# Patient Record
Sex: FEMALE | Race: WHITE | NOT HISPANIC OR LATINO | Employment: STUDENT | URBAN - METROPOLITAN AREA
[De-identification: names, ages, dates, MRNs, and addresses within clinical notes are randomized per-mention and may not be internally consistent; named-entity substitution may affect disease eponyms.]

---

## 2022-11-29 ENCOUNTER — APPOINTMENT (EMERGENCY)
Dept: CT IMAGING | Facility: HOSPITAL | Age: 22
End: 2022-11-29

## 2022-11-29 ENCOUNTER — HOSPITAL ENCOUNTER (EMERGENCY)
Facility: HOSPITAL | Age: 22
Discharge: HOME | End: 2022-11-30
Attending: EMERGENCY MEDICINE

## 2022-11-29 DIAGNOSIS — R10.9 ABDOMINAL PAIN: ICD-10-CM

## 2022-11-29 DIAGNOSIS — N83.201 CYST OF RIGHT OVARY: Primary | ICD-10-CM

## 2022-11-29 LAB
ALBUMIN SERPL BCP-MCNC: 4.5 G/DL (ref 3.5–5)
ALP SERPL-CCNC: 49 U/L (ref 46–116)
ALT SERPL W P-5'-P-CCNC: 21 U/L (ref 12–78)
ANION GAP SERPL CALCULATED.3IONS-SCNC: 15 MMOL/L (ref 4–13)
AST SERPL W P-5'-P-CCNC: 18 U/L (ref 5–45)
BASOPHILS # BLD AUTO: 0.09 THOUSANDS/ÂΜL (ref 0–0.1)
BASOPHILS NFR BLD AUTO: 1 % (ref 0–1)
BILIRUB SERPL-MCNC: 1.12 MG/DL (ref 0.2–1)
BILIRUB UR QL STRIP: NEGATIVE
BUN SERPL-MCNC: 12 MG/DL (ref 5–25)
CALCIUM SERPL-MCNC: 9.6 MG/DL (ref 8.3–10.1)
CHLORIDE SERPL-SCNC: 97 MMOL/L (ref 96–108)
CLARITY UR: CLEAR
CO2 SERPL-SCNC: 23 MMOL/L (ref 21–32)
COLOR UR: ABNORMAL
CREAT SERPL-MCNC: 0.96 MG/DL (ref 0.6–1.3)
EOSINOPHIL # BLD AUTO: 0.17 THOUSAND/ÂΜL (ref 0–0.61)
EOSINOPHIL NFR BLD AUTO: 2 % (ref 0–6)
ERYTHROCYTE [DISTWIDTH] IN BLOOD BY AUTOMATED COUNT: 13.7 % (ref 11.6–15.1)
EXT PREGNANCY TEST URINE: NEGATIVE
EXT. CONTROL: NORMAL
GFR SERPL CREATININE-BSD FRML MDRD: 84 ML/MIN/1.73SQ M
GLUCOSE SERPL-MCNC: 86 MG/DL (ref 65–140)
GLUCOSE UR STRIP-MCNC: NEGATIVE MG/DL
HCT VFR BLD AUTO: 43.5 % (ref 34.8–46.1)
HGB BLD-MCNC: 14.5 G/DL (ref 11.5–15.4)
HGB UR QL STRIP.AUTO: NEGATIVE
IMM GRANULOCYTES # BLD AUTO: 0.03 THOUSAND/UL (ref 0–0.2)
IMM GRANULOCYTES NFR BLD AUTO: 0 % (ref 0–2)
KETONES UR STRIP-MCNC: ABNORMAL MG/DL
LEUKOCYTE ESTERASE UR QL STRIP: NEGATIVE
LIPASE SERPL-CCNC: 63 U/L (ref 73–393)
LYMPHOCYTES # BLD AUTO: 2.53 THOUSANDS/ÂΜL (ref 0.6–4.47)
LYMPHOCYTES NFR BLD AUTO: 24 % (ref 14–44)
MCH RBC QN AUTO: 28.7 PG (ref 26.8–34.3)
MCHC RBC AUTO-ENTMCNC: 33.3 G/DL (ref 31.4–37.4)
MCV RBC AUTO: 86 FL (ref 82–98)
MONOCYTES # BLD AUTO: 0.84 THOUSAND/ÂΜL (ref 0.17–1.22)
MONOCYTES NFR BLD AUTO: 8 % (ref 4–12)
NEUTROPHILS # BLD AUTO: 7.03 THOUSANDS/ÂΜL (ref 1.85–7.62)
NEUTS SEG NFR BLD AUTO: 65 % (ref 43–75)
NITRITE UR QL STRIP: NEGATIVE
NRBC BLD AUTO-RTO: 0 /100 WBCS
PH UR STRIP.AUTO: 5.5 [PH]
PLATELET # BLD AUTO: 263 THOUSANDS/UL (ref 149–390)
PMV BLD AUTO: 11.3 FL (ref 8.9–12.7)
POTASSIUM SERPL-SCNC: 3.9 MMOL/L (ref 3.5–5.3)
PROT SERPL-MCNC: 8.4 G/DL (ref 6.4–8.4)
PROT UR STRIP-MCNC: NEGATIVE MG/DL
RBC # BLD AUTO: 5.06 MILLION/UL (ref 3.81–5.12)
SODIUM SERPL-SCNC: 135 MMOL/L (ref 135–147)
SP GR UR STRIP.AUTO: 1.02 (ref 1–1.03)
UROBILINOGEN UR STRIP-ACNC: <2 MG/DL
WBC # BLD AUTO: 10.69 THOUSAND/UL (ref 4.31–10.16)

## 2022-11-29 RX ORDER — ONDANSETRON 2 MG/ML
4 INJECTION INTRAMUSCULAR; INTRAVENOUS ONCE
Status: COMPLETED | OUTPATIENT
Start: 2022-11-29 | End: 2022-11-29

## 2022-11-29 RX ADMIN — SODIUM CHLORIDE 1000 ML: 0.9 INJECTION, SOLUTION INTRAVENOUS at 21:51

## 2022-11-29 RX ADMIN — IOHEXOL 100 ML: 350 INJECTION, SOLUTION INTRAVENOUS at 22:59

## 2022-11-29 RX ADMIN — ONDANSETRON 4 MG: 2 INJECTION INTRAMUSCULAR; INTRAVENOUS at 21:55

## 2022-11-30 ENCOUNTER — ANESTHESIA (EMERGENCY)
Dept: PERIOP | Facility: HOSPITAL | Age: 22
End: 2022-11-30

## 2022-11-30 ENCOUNTER — APPOINTMENT (EMERGENCY)
Dept: ULTRASOUND IMAGING | Facility: HOSPITAL | Age: 22
End: 2022-11-30

## 2022-11-30 ENCOUNTER — ANESTHESIA EVENT (EMERGENCY)
Dept: PERIOP | Facility: HOSPITAL | Age: 22
End: 2022-11-30

## 2022-11-30 ENCOUNTER — OFFICE VISIT (OUTPATIENT)
Dept: OBGYN CLINIC | Facility: CLINIC | Age: 22
End: 2022-11-30

## 2022-11-30 VITALS
WEIGHT: 135 LBS | HEART RATE: 113 BPM | OXYGEN SATURATION: 100 % | DIASTOLIC BLOOD PRESSURE: 87 MMHG | BODY MASS INDEX: 23.92 KG/M2 | TEMPERATURE: 99 F | RESPIRATION RATE: 18 BRPM | SYSTOLIC BLOOD PRESSURE: 131 MMHG | HEIGHT: 63 IN

## 2022-11-30 VITALS
DIASTOLIC BLOOD PRESSURE: 75 MMHG | HEART RATE: 80 BPM | OXYGEN SATURATION: 99 % | TEMPERATURE: 97.5 F | RESPIRATION RATE: 19 BRPM | SYSTOLIC BLOOD PRESSURE: 121 MMHG

## 2022-11-30 VITALS — HEIGHT: 63 IN | WEIGHT: 127 LBS | BODY MASS INDEX: 22.5 KG/M2

## 2022-11-30 DIAGNOSIS — R10.2 PELVIC PAIN: ICD-10-CM

## 2022-11-30 DIAGNOSIS — K59.00 CONSTIPATION, UNSPECIFIED CONSTIPATION TYPE: ICD-10-CM

## 2022-11-30 DIAGNOSIS — R10.9 ABDOMINAL PAIN: ICD-10-CM

## 2022-11-30 DIAGNOSIS — N83.201 RIGHT OVARIAN CYST: ICD-10-CM

## 2022-11-30 DIAGNOSIS — N83.201 CYST OF RIGHT OVARY: Primary | ICD-10-CM

## 2022-11-30 DIAGNOSIS — Z11.3 SCREENING FOR STD (SEXUALLY TRANSMITTED DISEASE): ICD-10-CM

## 2022-11-30 DIAGNOSIS — N83.209 OVARIAN CYST: Primary | ICD-10-CM

## 2022-11-30 DIAGNOSIS — N83.519 TORSION, OVARY: ICD-10-CM

## 2022-11-30 PROBLEM — G90.A POSTURAL ORTHOSTATIC TACHYCARDIA SYNDROME: Status: ACTIVE | Noted: 2022-11-30

## 2022-11-30 LAB
ALBUMIN SERPL BCP-MCNC: 4.8 G/DL (ref 3.5–5)
ALP SERPL-CCNC: 40 U/L (ref 34–104)
ALT SERPL W P-5'-P-CCNC: 12 U/L (ref 7–52)
ANION GAP SERPL CALCULATED.3IONS-SCNC: 15 MMOL/L (ref 4–13)
AST SERPL W P-5'-P-CCNC: 14 U/L (ref 13–39)
BACTERIA UR QL AUTO: ABNORMAL /HPF
BASOPHILS # BLD AUTO: 0.07 THOUSANDS/ÂΜL (ref 0–0.1)
BASOPHILS NFR BLD AUTO: 1 % (ref 0–1)
BILIRUB SERPL-MCNC: 0.9 MG/DL (ref 0.2–1)
BILIRUB UR QL STRIP: NEGATIVE
BUN SERPL-MCNC: 11 MG/DL (ref 5–25)
CALCIUM SERPL-MCNC: 10.1 MG/DL (ref 8.4–10.2)
CHLORIDE SERPL-SCNC: 103 MMOL/L (ref 96–108)
CLARITY UR: CLEAR
CO2 SERPL-SCNC: 19 MMOL/L (ref 21–32)
COLOR UR: ABNORMAL
CREAT SERPL-MCNC: 0.8 MG/DL (ref 0.6–1.3)
EOSINOPHIL # BLD AUTO: 0.16 THOUSAND/ÂΜL (ref 0–0.61)
EOSINOPHIL NFR BLD AUTO: 2 % (ref 0–6)
ERYTHROCYTE [DISTWIDTH] IN BLOOD BY AUTOMATED COUNT: 13.8 % (ref 11.6–15.1)
EXT PREGNANCY TEST URINE: NEGATIVE
EXT. CONTROL: NORMAL
GFR SERPL CREATININE-BSD FRML MDRD: 105 ML/MIN/1.73SQ M
GLUCOSE SERPL-MCNC: 74 MG/DL (ref 65–140)
GLUCOSE UR STRIP-MCNC: NEGATIVE MG/DL
HCT VFR BLD AUTO: 43.3 % (ref 34.8–46.1)
HGB BLD-MCNC: 14 G/DL (ref 11.5–15.4)
HGB UR QL STRIP.AUTO: ABNORMAL
IMM GRANULOCYTES # BLD AUTO: 0.02 THOUSAND/UL (ref 0–0.2)
IMM GRANULOCYTES NFR BLD AUTO: 0 % (ref 0–2)
KETONES UR STRIP-MCNC: ABNORMAL MG/DL
LEUKOCYTE ESTERASE UR QL STRIP: NEGATIVE
LYMPHOCYTES # BLD AUTO: 1.93 THOUSANDS/ÂΜL (ref 0.6–4.47)
LYMPHOCYTES NFR BLD AUTO: 25 % (ref 14–44)
MCH RBC QN AUTO: 27.8 PG (ref 26.8–34.3)
MCHC RBC AUTO-ENTMCNC: 32.3 G/DL (ref 31.4–37.4)
MCV RBC AUTO: 86 FL (ref 82–98)
MONOCYTES # BLD AUTO: 0.58 THOUSAND/ÂΜL (ref 0.17–1.22)
MONOCYTES NFR BLD AUTO: 7 % (ref 4–12)
MUCOUS THREADS UR QL AUTO: ABNORMAL
NEUTROPHILS # BLD AUTO: 5.07 THOUSANDS/ÂΜL (ref 1.85–7.62)
NEUTS SEG NFR BLD AUTO: 65 % (ref 43–75)
NITRITE UR QL STRIP: NEGATIVE
NON-SQ EPI CELLS URNS QL MICRO: ABNORMAL /HPF
NRBC BLD AUTO-RTO: 0 /100 WBCS
PH UR STRIP.AUTO: 5.5 [PH]
PLATELET # BLD AUTO: 265 THOUSANDS/UL (ref 149–390)
PMV BLD AUTO: 11.5 FL (ref 8.9–12.7)
POTASSIUM SERPL-SCNC: 4 MMOL/L (ref 3.5–5.3)
PROT SERPL-MCNC: 8.1 G/DL (ref 6.4–8.4)
PROT UR STRIP-MCNC: ABNORMAL MG/DL
RBC # BLD AUTO: 5.04 MILLION/UL (ref 3.81–5.12)
RBC #/AREA URNS AUTO: ABNORMAL /HPF
SODIUM SERPL-SCNC: 137 MMOL/L (ref 135–147)
SP GR UR STRIP.AUTO: 1.03 (ref 1–1.03)
UROBILINOGEN UR STRIP-ACNC: <2 MG/DL
WBC # BLD AUTO: 7.83 THOUSAND/UL (ref 4.31–10.16)
WBC #/AREA URNS AUTO: ABNORMAL /HPF

## 2022-11-30 RX ORDER — OXYCODONE HYDROCHLORIDE 5 MG/1
5 TABLET ORAL EVERY 4 HOURS PRN
Status: DISCONTINUED | OUTPATIENT
Start: 2022-11-30 | End: 2022-12-01 | Stop reason: HOSPADM

## 2022-11-30 RX ORDER — CYCLOBENZAPRINE HCL 5 MG
5 TABLET ORAL 3 TIMES DAILY PRN
COMMUNITY

## 2022-11-30 RX ORDER — ACETAMINOPHEN 325 MG/1
975 TABLET ORAL EVERY 6 HOURS PRN
Status: DISCONTINUED | OUTPATIENT
Start: 2022-11-30 | End: 2022-12-01 | Stop reason: HOSPADM

## 2022-11-30 RX ORDER — ROCURONIUM BROMIDE 10 MG/ML
INJECTION, SOLUTION INTRAVENOUS AS NEEDED
Status: DISCONTINUED | OUTPATIENT
Start: 2022-11-30 | End: 2022-11-30

## 2022-11-30 RX ORDER — MAGNESIUM HYDROXIDE 1200 MG/15ML
LIQUID ORAL AS NEEDED
Status: DISCONTINUED | OUTPATIENT
Start: 2022-11-30 | End: 2022-11-30 | Stop reason: HOSPADM

## 2022-11-30 RX ORDER — IBUPROFEN 600 MG/1
600 TABLET ORAL EVERY 6 HOURS PRN
Status: DISCONTINUED | OUTPATIENT
Start: 2022-11-30 | End: 2022-12-01 | Stop reason: HOSPADM

## 2022-11-30 RX ORDER — ALBUTEROL SULFATE 2.5 MG/3ML
2.5 SOLUTION RESPIRATORY (INHALATION) ONCE AS NEEDED
Status: DISCONTINUED | OUTPATIENT
Start: 2022-11-30 | End: 2022-11-30

## 2022-11-30 RX ORDER — FENTANYL CITRATE 50 UG/ML
50 INJECTION, SOLUTION INTRAMUSCULAR; INTRAVENOUS ONCE
Status: COMPLETED | OUTPATIENT
Start: 2022-11-30 | End: 2022-11-30

## 2022-11-30 RX ORDER — ACETAMINOPHEN 325 MG/1
650 TABLET ORAL EVERY 6 HOURS PRN
Qty: 30 TABLET | Refills: 0 | Status: SHIPPED | OUTPATIENT
Start: 2022-11-30

## 2022-11-30 RX ORDER — IBUPROFEN 200 MG
TABLET ORAL
Status: ON HOLD | COMMUNITY
End: 2022-11-30 | Stop reason: SDUPTHER

## 2022-11-30 RX ORDER — OXYCODONE HYDROCHLORIDE 5 MG/1
5 TABLET ORAL EVERY 4 HOURS PRN
Qty: 4 TABLET | Refills: 0 | Status: SHIPPED | OUTPATIENT
Start: 2022-11-30 | End: 2022-12-10

## 2022-11-30 RX ORDER — DIPHENHYDRAMINE HYDROCHLORIDE 50 MG/ML
12.5 INJECTION INTRAMUSCULAR; INTRAVENOUS ONCE AS NEEDED
Status: DISCONTINUED | OUTPATIENT
Start: 2022-11-30 | End: 2022-11-30

## 2022-11-30 RX ORDER — HYDROMORPHONE HCL/PF 1 MG/ML
SYRINGE (ML) INJECTION AS NEEDED
Status: DISCONTINUED | OUTPATIENT
Start: 2022-11-30 | End: 2022-11-30

## 2022-11-30 RX ORDER — SODIUM CHLORIDE 9 MG/ML
INJECTION, SOLUTION INTRAVENOUS CONTINUOUS PRN
Status: DISCONTINUED | OUTPATIENT
Start: 2022-11-30 | End: 2022-11-30

## 2022-11-30 RX ORDER — FENTANYL CITRATE 50 UG/ML
INJECTION, SOLUTION INTRAMUSCULAR; INTRAVENOUS AS NEEDED
Status: DISCONTINUED | OUTPATIENT
Start: 2022-11-30 | End: 2022-11-30

## 2022-11-30 RX ORDER — ONDANSETRON 2 MG/ML
4 INJECTION INTRAMUSCULAR; INTRAVENOUS ONCE AS NEEDED
Status: COMPLETED | OUTPATIENT
Start: 2022-11-30 | End: 2022-11-30

## 2022-11-30 RX ORDER — ALPRAZOLAM 0.25 MG/1
0.25 TABLET ORAL
COMMUNITY

## 2022-11-30 RX ORDER — METOCLOPRAMIDE HYDROCHLORIDE 5 MG/ML
10 INJECTION INTRAMUSCULAR; INTRAVENOUS ONCE AS NEEDED
Status: COMPLETED | OUTPATIENT
Start: 2022-11-30 | End: 2022-11-30

## 2022-11-30 RX ORDER — MIDAZOLAM HYDROCHLORIDE 2 MG/2ML
INJECTION, SOLUTION INTRAMUSCULAR; INTRAVENOUS AS NEEDED
Status: DISCONTINUED | OUTPATIENT
Start: 2022-11-30 | End: 2022-11-30

## 2022-11-30 RX ORDER — IBUPROFEN 200 MG
600 TABLET ORAL EVERY 6 HOURS SCHEDULED
Qty: 30 TABLET | Refills: 0 | Status: SHIPPED | OUTPATIENT
Start: 2022-11-30

## 2022-11-30 RX ORDER — KETOROLAC TROMETHAMINE 30 MG/ML
15 INJECTION, SOLUTION INTRAMUSCULAR; INTRAVENOUS ONCE
Status: COMPLETED | OUTPATIENT
Start: 2022-11-30 | End: 2022-11-30

## 2022-11-30 RX ORDER — DEXAMETHASONE SODIUM PHOSPHATE 10 MG/ML
INJECTION, SOLUTION INTRAMUSCULAR; INTRAVENOUS AS NEEDED
Status: DISCONTINUED | OUTPATIENT
Start: 2022-11-30 | End: 2022-11-30

## 2022-11-30 RX ORDER — FENTANYL CITRATE/PF 50 MCG/ML
25 SYRINGE (ML) INJECTION
Status: DISCONTINUED | OUTPATIENT
Start: 2022-11-30 | End: 2022-11-30

## 2022-11-30 RX ORDER — BUPIVACAINE HYDROCHLORIDE 2.5 MG/ML
INJECTION, SOLUTION EPIDURAL; INFILTRATION; INTRACAUDAL AS NEEDED
Status: DISCONTINUED | OUTPATIENT
Start: 2022-11-30 | End: 2022-11-30 | Stop reason: HOSPADM

## 2022-11-30 RX ORDER — ONDANSETRON 2 MG/ML
4 INJECTION INTRAMUSCULAR; INTRAVENOUS EVERY 6 HOURS PRN
Status: DISCONTINUED | OUTPATIENT
Start: 2022-11-30 | End: 2022-12-01 | Stop reason: HOSPADM

## 2022-11-30 RX ORDER — HYDROMORPHONE HCL IN WATER/PF 6 MG/30 ML
0.2 PATIENT CONTROLLED ANALGESIA SYRINGE INTRAVENOUS
Status: DISCONTINUED | OUTPATIENT
Start: 2022-11-30 | End: 2022-11-30

## 2022-11-30 RX ORDER — LIDOCAINE HYDROCHLORIDE 10 MG/ML
INJECTION, SOLUTION EPIDURAL; INFILTRATION; INTRACAUDAL; PERINEURAL AS NEEDED
Status: DISCONTINUED | OUTPATIENT
Start: 2022-11-30 | End: 2022-11-30

## 2022-11-30 RX ORDER — KETOROLAC TROMETHAMINE 30 MG/ML
INJECTION, SOLUTION INTRAMUSCULAR; INTRAVENOUS AS NEEDED
Status: DISCONTINUED | OUTPATIENT
Start: 2022-11-30 | End: 2022-11-30

## 2022-11-30 RX ORDER — GLYCOPYRROLATE 0.2 MG/ML
INJECTION INTRAMUSCULAR; INTRAVENOUS AS NEEDED
Status: DISCONTINUED | OUTPATIENT
Start: 2022-11-30 | End: 2022-11-30

## 2022-11-30 RX ORDER — SODIUM CHLORIDE, SODIUM LACTATE, POTASSIUM CHLORIDE, CALCIUM CHLORIDE 600; 310; 30; 20 MG/100ML; MG/100ML; MG/100ML; MG/100ML
125 INJECTION, SOLUTION INTRAVENOUS CONTINUOUS
Status: DISCONTINUED | OUTPATIENT
Start: 2022-11-30 | End: 2022-11-30

## 2022-11-30 RX ORDER — OXYCODONE HYDROCHLORIDE 5 MG/1
10 TABLET ORAL EVERY 4 HOURS PRN
Status: DISCONTINUED | OUTPATIENT
Start: 2022-11-30 | End: 2022-12-01 | Stop reason: HOSPADM

## 2022-11-30 RX ORDER — PROPOFOL 10 MG/ML
INJECTION, EMULSION INTRAVENOUS CONTINUOUS PRN
Status: DISCONTINUED | OUTPATIENT
Start: 2022-11-30 | End: 2022-11-30

## 2022-11-30 RX ORDER — NEOSTIGMINE METHYLSULFATE 1 MG/ML
INJECTION INTRAVENOUS AS NEEDED
Status: DISCONTINUED | OUTPATIENT
Start: 2022-11-30 | End: 2022-11-30

## 2022-11-30 RX ORDER — MORPHINE SULFATE 4 MG/ML
4 INJECTION, SOLUTION INTRAMUSCULAR; INTRAVENOUS ONCE
Status: COMPLETED | OUTPATIENT
Start: 2022-11-30 | End: 2022-11-30

## 2022-11-30 RX ORDER — PROPOFOL 10 MG/ML
INJECTION, EMULSION INTRAVENOUS AS NEEDED
Status: DISCONTINUED | OUTPATIENT
Start: 2022-11-30 | End: 2022-11-30

## 2022-11-30 RX ADMIN — SODIUM CHLORIDE, SODIUM LACTATE, POTASSIUM CHLORIDE, AND CALCIUM CHLORIDE: .6; .31; .03; .02 INJECTION, SOLUTION INTRAVENOUS at 19:23

## 2022-11-30 RX ADMIN — SODIUM CHLORIDE: 0.9 INJECTION, SOLUTION INTRAVENOUS at 19:29

## 2022-11-30 RX ADMIN — KETOROLAC TROMETHAMINE 15 MG: 30 INJECTION, SOLUTION INTRAMUSCULAR at 02:42

## 2022-11-30 RX ADMIN — FENTANYL CITRATE 50 MCG: 50 INJECTION, SOLUTION INTRAMUSCULAR; INTRAVENOUS at 19:44

## 2022-11-30 RX ADMIN — PROPOFOL 30 MCG/KG/MIN: 10 INJECTION, EMULSION INTRAVENOUS at 19:30

## 2022-11-30 RX ADMIN — FENTANYL CITRATE 50 MCG: 50 INJECTION INTRAMUSCULAR; INTRAVENOUS at 17:09

## 2022-11-30 RX ADMIN — METOCLOPRAMIDE 10 MG: 5 INJECTION, SOLUTION INTRAMUSCULAR; INTRAVENOUS at 21:19

## 2022-11-30 RX ADMIN — ONDANSETRON 4 MG: 2 INJECTION INTRAMUSCULAR; INTRAVENOUS at 19:30

## 2022-11-30 RX ADMIN — MORPHINE SULFATE 4 MG: 4 INJECTION INTRAVENOUS at 17:59

## 2022-11-30 RX ADMIN — LIDOCAINE HYDROCHLORIDE 50 MG: 10 INJECTION, SOLUTION EPIDURAL; INFILTRATION; INTRACAUDAL at 19:28

## 2022-11-30 RX ADMIN — DEXAMETHASONE SODIUM PHOSPHATE 10 MG: 10 INJECTION, SOLUTION INTRAMUSCULAR; INTRAVENOUS at 19:30

## 2022-11-30 RX ADMIN — ACETAMINOPHEN 975 MG: 325 TABLET ORAL at 22:00

## 2022-11-30 RX ADMIN — KETOROLAC TROMETHAMINE 30 MG: 30 INJECTION, SOLUTION INTRAMUSCULAR; INTRAVENOUS at 20:16

## 2022-11-30 RX ADMIN — HYDROMORPHONE HYDROCHLORIDE 0.25 MG: 1 INJECTION, SOLUTION INTRAMUSCULAR; INTRAVENOUS; SUBCUTANEOUS at 19:58

## 2022-11-30 RX ADMIN — MIDAZOLAM HYDROCHLORIDE 2 MG: 1 INJECTION, SOLUTION INTRAMUSCULAR; INTRAVENOUS at 19:25

## 2022-11-30 RX ADMIN — GLYCOPYRROLATE 0.4 MG: 0.2 INJECTION, SOLUTION INTRAMUSCULAR; INTRAVENOUS at 20:22

## 2022-11-30 RX ADMIN — NEOSTIGMINE METHYLSULFATE 3 MG: 1 INJECTION INTRAVENOUS at 20:22

## 2022-11-30 RX ADMIN — ROCURONIUM BROMIDE 40 MG: 10 SOLUTION INTRAVENOUS at 19:28

## 2022-11-30 RX ADMIN — PROPOFOL 200 MG: 10 INJECTION, EMULSION INTRAVENOUS at 19:28

## 2022-11-30 RX ADMIN — FENTANYL CITRATE 100 MCG: 50 INJECTION, SOLUTION INTRAMUSCULAR; INTRAVENOUS at 19:25

## 2022-11-30 RX ADMIN — HYDROMORPHONE HYDROCHLORIDE 0.25 MG: 1 INJECTION, SOLUTION INTRAMUSCULAR; INTRAVENOUS; SUBCUTANEOUS at 20:07

## 2022-11-30 NOTE — ANESTHESIA PREPROCEDURE EVALUATION
Procedure:  LAPAROSCOPY DIAGNOSTIC (Abdomen)  CYSTECTOMY OVARIAN, LAPAROSCOPIC (Right: Abdomen)  OOPHORECTOMY, LAPAROSCOPIC (Abdomen)    Relevant Problems   Cardiovascular and Mediastinum   (+) Postural orthostatic tachycardia syndrome        Physical Exam    Airway    Mallampati score: I  TM Distance: >3 FB  Neck ROM: full     Dental       Cardiovascular  Cardiovascular exam normal    Pulmonary  Pulmonary exam normal     Other Findings        Anesthesia Plan  ASA Score- 1 Emergent    Anesthesia Type- general with ASA Monitors  Additional Monitors:   Airway Plan:           Plan Factors-Exercise tolerance (METS): >4 METS  Chart reviewed  EKG reviewed  Imaging results reviewed  Existing labs reviewed  Patient summary reviewed  Induction- intravenous  Postoperative Plan- Plan for postoperative opioid use  Planned trial extubation    Informed Consent- Anesthetic plan and risks discussed with patient  I personally reviewed this patient with the CRNA  Discussed and agreed on the Anesthesia Plan with the CRNA  Hipolito Humphrey

## 2022-11-30 NOTE — DISCHARGE INSTRUCTIONS
Your CT revealed, "Right-sided 4 x 3 5 cm adnexal cyst   If clinically warranted pelvic sonogram may be obtained for further evaluation"    Ultrasound revealed, "3 5 cm hypoechoic cyst in the right ovary, vascular flow to the right ovary is preserved, this does not exclude a diagnosis of ovarian torsion  Correlation with the patient's symptoms recommended  Small amount of hypoechoic fluid in the region of the right adnexa and pelvis, nonspecific "    OB/GYN recommends that you follow up as an outpatient  Return with any new or worsening symptoms

## 2022-11-30 NOTE — ED NOTES
Patient transported to [facility] via [address], RN  11/29/22 [address]

## 2022-11-30 NOTE — ED PROVIDER NOTES
History  Chief Complaint   Patient presents with   • Pelvic Pain     Pt states she began with R lower pelvic pain x 4 days  +N  10/10 pain  Denies urinary sx  Denies vaginal bleeding but reports hemorrhoids  Sent for surgery for R ovarian torsion      Patient is a 43-year-old female presenting to the emergency department for evaluation of right lower quadrant abdominal pain  Patient presented to the [facility] last night around 11:00 p m  for right lower chronic abdominal pain  She had a CT scan at that time that demonstrated a right adnexal cyst   Her symptoms were described as mild per chart review  Patient was told that she can follow-up as an outpatient OB  She did have a urine pregnancy that was negative at that time, patient further adds that she has not had any sexual partners for last 2 years  Patient did follow up at an outside [facility] office  They did an ultrasound demonstrated the previously mentioned right adnexal cyst with vascular supply that was preserved  Her OB at that time was very concerned for ovarian torsion so she recommended she proceed immediately to the emergency department  Upon presentation patient is in moderate to severe distress  She endorses extreme right lower quadrant abdominal pain that has gotten worse since her presentation last night  She denies any new vaginal symptoms including bleeding or discharge  Her last period was approximately 1 month ago and she is now having some mild spotting she attributes to the beginning of her period  She further remarks that she has had some mild blood in her stool which she attributes to a hemorrhoid  She states she initially had some mild blood in her stool next giving but has been using preparation H and her bleeding has resolved and she feels much better  Prior to Admission Medications   Prescriptions Last Dose Informant Patient Reported? Taking?    cyclobenzaprine (FLEXERIL) 5 mg tablet   Yes No   Sig: Take 5 mg by mouth Three times daily as needed   ibuprofen (MOTRIN) 200 mg tablet   Yes No   Sig: Take by mouth      Facility-Administered Medications: None       Past Medical History:   Diagnosis Date   • Postural orthostatic tachycardia syndrome        Past Surgical History:   Procedure Laterality Date   • NASAL POLYP SURGERY         Family History   Problem Relation Age of Onset   • Ovarian cancer Paternal Grandmother    • Breast cancer Paternal Aunt    • Breast cancer Maternal Aunt      I have reviewed and agree with the history as documented  E-Cigarette/Vaping   • E-Cigarette Use Never User      E-Cigarette/Vaping Substances     Social History     Tobacco Use   • Smoking status: Never     Passive exposure: Never   • Smokeless tobacco: Never   Vaping Use   • Vaping Use: Never used   Substance Use Topics   • Alcohol use: Not Currently   • Drug use: Yes     Types: Marijuana        Review of Systems   Constitutional: Negative  HENT: Negative  Eyes: Negative  Respiratory: Negative  Cardiovascular: Negative  Gastrointestinal: Positive for abdominal pain  Endocrine: Negative  Genitourinary: Negative  Musculoskeletal: Negative  Skin: Negative  Allergic/Immunologic: Negative  Neurological: Negative  Hematological: Negative  Psychiatric/Behavioral: Negative  All other systems reviewed and are negative        Physical Exam  ED Triage Vitals   Temperature Pulse Respirations Blood Pressure SpO2   11/30/22 1553 11/30/22 1553 11/30/22 1553 11/30/22 1553 11/30/22 1553   98 3 °F (36 8 °C) (!) 124 20 140/91 100 %      Temp Source Heart Rate Source Patient Position - Orthostatic VS BP Location FiO2 (%)   11/30/22 1553 11/30/22 1553 11/30/22 1553 11/30/22 1553 --   Oral Monitor Sitting Right arm       Pain Score       11/30/22 1709       9             Orthostatic Vital Signs  Vitals:    11/30/22 1553   BP: 140/91   Pulse: (!) 124   Patient Position - Orthostatic VS: Sitting       Physical Exam  Vitals and nursing note reviewed  Constitutional:       General: She is not in acute distress  Appearance: Normal appearance  She is not ill-appearing, toxic-appearing or diaphoretic  HENT:      Head: Normocephalic and atraumatic  Eyes:      General: No scleral icterus  Right eye: No discharge  Left eye: No discharge  Extraocular Movements: Extraocular movements intact  Conjunctiva/sclera: Conjunctivae normal       Pupils: Pupils are equal, round, and reactive to light  Cardiovascular:      Rate and Rhythm: Normal rate  Pulses: Normal pulses  Heart sounds: Normal heart sounds  No murmur heard  No friction rub  No gallop  Pulmonary:      Effort: Pulmonary effort is normal  No respiratory distress  Breath sounds: Normal breath sounds  No stridor  No wheezing, rhonchi or rales  Abdominal:      General: Abdomen is flat  Bowel sounds are normal  There is no distension  Palpations: Abdomen is soft  Tenderness: There is abdominal tenderness  There is no guarding or rebound  Comments: Severe right lower quadrant abdominal pain  Very minimal suprapubic abdominal pain, likely overlapping with right lower quadrant   Musculoskeletal:         General: No swelling  Normal range of motion  Cervical back: Normal range of motion  No rigidity  Right lower leg: No edema  Left lower leg: No edema  Skin:     General: Skin is warm and dry  Capillary Refill: Capillary refill takes less than 2 seconds  Coloration: Skin is not jaundiced  Findings: No bruising or lesion  Neurological:      General: No focal deficit present  Mental Status: She is alert and oriented to person, place, and time  Mental status is at baseline  Psychiatric:         Mood and Affect: Mood normal          Behavior: Behavior normal          Thought Content:  Thought content normal          Judgment: Judgment normal          ED Medications  Medications   fentanyl citrate (PF) 100 MCG/2ML 50 mcg (50 mcg Intravenous Given 11/30/22 1709)       Diagnostic Studies  Results Reviewed     Procedure Component Value Units Date/Time    Comprehensive metabolic panel [[id number]]  (Abnormal) Collected: 11/30/22 1557    Lab Status: Final result Specimen: Blood from Arm, Right Updated: 11/30/22 1657     Sodium 137 mmol/L      Potassium 4 0 mmol/L      Chloride 103 mmol/L      CO2 19 mmol/L      ANION GAP 15 mmol/L      BUN 11 mg/dL      Creatinine 0 80 mg/dL      Glucose 74 mg/dL      Calcium 10 1 mg/dL      AST 14 U/L      ALT 12 U/L      Alkaline Phosphatase 40 U/L      Total Protein 8 1 g/dL      Albumin 4 8 g/dL      Total Bilirubin 0 90 mg/dL      eGFR 105 ml/min/1 73sq m     Narrative:      National Kidney Disease Foundation guidelines for Chronic Kidney Disease (CKD):   •  Stage 1 with normal or high GFR (GFR > 90 mL/min/1 73 square meters)  •  Stage 2 Mild CKD (GFR = 60-89 mL/min/1 73 square meters)  •  Stage 3A Moderate CKD (GFR = 45-59 mL/min/1 73 square meters)  •  Stage 3B Moderate CKD (GFR = 30-44 mL/min/1 73 square meters)  •  Stage 4 Severe CKD (GFR = 15-29 mL/min/1 73 square meters)  •  Stage 5 End Stage CKD (GFR <15 mL/min/1 73 square meters)  Note: GFR calculation is accurate only with a steady state creatinine    CBC and differential [[id number]] Collected: 11/30/22 1557    Lab Status: Final result Specimen: Blood from Arm, Right Updated: 11/30/22 1628     WBC 7 83 Thousand/uL      RBC 5 04 Million/uL      Hemoglobin 14 0 g/dL      Hematocrit 43 3 %      MCV 86 fL      MCH 27 8 pg      MCHC 32 3 g/dL      RDW 13 8 %      MPV 11 5 fL      Platelets 697 Thousands/uL      nRBC 0 /100 WBCs      Neutrophils Relative 65 %      Immat GRANS % 0 %      Lymphocytes Relative 25 %      Monocytes Relative 7 %      Eosinophils Relative 2 %      Basophils Relative 1 %      Neutrophils Absolute 5 07 Thousands/µL      Immature Grans Absolute 0 02 Thousand/uL      Lymphocytes Absolute 1 93 Thousands/µL      Monocytes Absolute 0 58 Thousand/µL      Eosinophils Absolute 0 16 Thousand/µL      Basophils Absolute 0 07 Thousands/µL     UA (URINE) with reflex to Scope [[id number]]     Lab Status: No result Specimen: Urine     POCT pregnancy, urine [[id number]]     Lab Status: No result                  No orders to display         Procedures  Procedures      ED Course                                       MDM    Disposition  Final diagnoses:   None     ED Disposition     None      Follow-up Information    None         Patient's Medications   Discharge Prescriptions    No medications on file     No discharge procedures on file  PDMP Review     None           ED Provider  Attending physically available and evaluated Rolando Zimmerman I managed the patient along with the ED Attending      Electronically Signed by pain: new and requires workup  Ovarian cyst: established and worsening  Diagnosis management comments: -patient presenting to emergency department for evaluation of abdominal pain  -patient in marked distress upon arrival   OB reach out to immediately, suspect ovarian torsion  -OB saw patient, will take patient to operating room for further management       Amount and/or Complexity of Data Reviewed  Clinical lab tests: ordered and reviewed  Tests in the medicine section of CPT®: ordered and reviewed  Decide to obtain previous medical records or to obtain history from someone other than the patient: yes  Review and summarize past medical records: yes  Discuss the patient with other providers: yes  Independent visualization of images, tracings, or specimens: yes        Disposition  Final diagnoses:   Ovarian cyst   Abdominal pain     Time reflects when diagnosis was documented in both MDM as applicable and the Disposition within this note     Time User Action Codes Description Comment    11/30/2022  6:21 PM Arik Age Add [N83 519] Torsion, ovary     11/30/2022  8:15 PM Abby Richard Modify [N83 519] Torsion, ovary     11/30/2022  8:26 PM Olga Supriya Modify [N83 519] Torsion, ovary     11/30/2022  8:26 PM Vero Oquendo [D93 193] Right ovarian cyst     12/5/2022  6:22 AM Jaquelin Salinas [W94 372] Ovarian cyst     12/5/2022  6:22 AM Cleophus Fuel Add [R10 9] Abdominal pain     12/5/2022  6:23 AM Rohan Beecham [K36 [id number]] Right ovarian cyst     12/5/2022  6:23 AM Cleophus Fuel Modify [C23 536] Ovarian cyst       ED Disposition     None      Follow-up Information     Follow up With Specialties Details Why Contact Info Additional Information    [address] [facility] Obstetrics and Gynecology Follow up  [place] [id number] [facility] [id number] [phone] [address] [facility], [facility] [address], [place], [place], [address] [facility]          Discharge Medication List as of 11/30/2022  8:58 PM      START taking these medications    Details   acetaminophen (TYLENOL) 325 mg tablet Take 2 tablets (650 mg total) by mouth every 6 (six) hours as needed for mild pain, Starting Wed 11/30/2022, Normal      oxyCODONE (Roxicodone) 5 immediate release tablet Take 1 tablet (5 mg total) by mouth every 4 (four) hours as needed for moderate pain for up to 10 days Max Daily Amount: 30 mg, Starting Wed 11/30/2022, Until Sat 12/10/2022 at 2359, Normal         CONTINUE these medications which have CHANGED    Details   ibuprofen (MOTRIN) 200 mg tablet Take 3 tablets (600 mg total) by mouth every 6 (six) hours, Starting Wed 11/30/2022, Normal         CONTINUE these medications which have NOT CHANGED    Details   ALPRAZolam (XANAX) 0 25 mg tablet Take 0 25 mg by mouth daily at bedtime as needed for anxiety, Historical Med      cyclobenzaprine (FLEXERIL) 5 mg tablet Take 5 mg by mouth Three times daily as needed, Historical Med           No discharge procedures on file  PDMP Review     None           ED Provider  Attending physically available and evaluated John Das I managed the patient along with the ED Attending      Electronically Signed by         Katya Rangel, DO  12/05/22 [address] Bay[address] Faust [address], DO  12/05/22 5140

## 2022-11-30 NOTE — PATIENT INSTRUCTIONS
Ovarian Cyst   AMBULATORY CARE:   An ovarian cyst  is a fluid-filled sac that grows in or on an ovary  You have 2 ovaries, 1 on each side of your uterus  They are small, about the shape of an almond  Ovarian cysts are common in women who have regular monthly cycles  During your monthly cycle, eggs are released from the ovaries  The cyst usually contains fluid but may sometimes have blood or tissue in it  Most ovarian cysts are harmless and go away without treatment in a few months  Some cysts can grow large, cause pain, or break open  Common signs and symptoms  include pressure, bloating or swelling in your lower abdomen on the side of the cyst  You may also have dull or sharp pain that may come and go  The following are less common signs and symptoms:  A dull ache in your lower back and thighs    Unusual vaginal bleeding    Weight gain you did not expect or plan    Pain during your monthly cycle    Tenderness in your breasts    Trouble completely emptying your bowels or bladder    The need to urinate often    Pain during sex    Call your local emergency number (911 in the [address] [facility]) if:   You have severe pain with fever and vomiting  You have sudden, severe abdominal pain  You are too weak, faint, or dizzy to stand up  You are breathing very quickly  Call your doctor or gynecologist if:   Your periods are early, late, or more painful than usual     You have questions or concerns about your condition or care  Treatment  will depend on your age, symptoms, and the kind of cyst you have  You may need any of the following:  Watchful waiting  may be recommended  This means the cyst is not treated right away  You will need to watch for any signs or symptoms that the cyst is growing  You may need to return for one or more ultrasounds after a certain period of time  These will show if your cyst has changed in size  Medicines:   You may need any of the following:     Birth control pills  may help control your monthly cycle, prevent cysts, or cause them to shrink  Acetaminophen  decreases pain and fever  It is available without a doctor's order  Ask how much to take and how often to take it  Follow directions  Read the labels of all other medicines you are using to see if they also contain acetaminophen, or ask your doctor or pharmacist  Acetaminophen can cause liver damage if not taken correctly  Do not use more than 4 grams (4,000 milligrams) total of acetaminophen in one day  NSAIDs , such as ibuprofen, help decrease swelling, pain, and fever  This medicine is available with or without a doctor's order  NSAIDs can cause stomach bleeding or kidney problems in certain people  If you take blood thinner medicine, always ask your healthcare provider if NSAIDs are safe for you  Always read the medicine label and follow directions  Prescription pain medicine  may be given  Ask your healthcare provider how to take this medicine safely  Some prescription pain medicines contain acetaminophen  Do not take other medicines that contain acetaminophen without talking to your healthcare provider  Too much acetaminophen may cause liver damage  Prescription pain medicine may cause constipation  Ask your healthcare provider how to prevent or treat constipation  Take your medicine as directed  Contact your healthcare provider if you think your medicine is not helping or if you have side effects  Tell him or her if you are allergic to any medicine  Keep a list of the medicines, vitamins, and herbs you take  Include the amounts, and when and why you take them  Bring the list or the pill bottles to follow-up visits  Carry your medicine list with you in case of an emergency  Surgery  may be needed to remove the ovarian cyst     Manage ovarian cysts: You can manage a current cyst and help healthcare providers find future cysts early    Apply heat to decrease pain and cramping from a cyst   Sit in a warm bath, or place a heating pad (turned on low) on your abdomen  Do this for 15 to 20 minutes every hour for comfort  Get regular pelvic exams or Pap smears  This will help providers find any new ovarian cysts  Tell your healthcare provider about any unusual changes in your monthly cycle  Follow up with your doctor or gynecologist as directed:  Write down your questions so you remember to ask them during your visits  © Copyright [employer] 2022 Information is for End User's use only and may not be sold, redistributed or otherwise used for commercial purposes  All illustrations and images included in CareNotes® are the copyrighted property of [employer]  or [address] [place], [place] [address]le[address]   The above information is an  only  It is not intended as medical advice for individual conditions or treatments  Talk to your doctor, nurse or pharmacist before following any medical regimen to see if it is safe and effective for you

## 2022-11-30 NOTE — ED PROVIDER NOTES
History  Chief Complaint   Patient presents with   • Abdominal Pain     Patient reports RLQ abdominal pain with constipation for the last few days  Daksha Jordan is a 25 y o  female with no pertinent past medical history presenting today with 2 days of intermittent right lower quadrant pain  Patient reports that yesterday around 12:00 p m , the pain became constant and she rates at a 7/10 in severity  She reports that she has had similar symptoms in the past   Reports that about 1 month ago she had a sinus surgery and was given tramadol  She reports that when she took tramadol she would have severe constipation and abdominal pain  Reports that she took tramadol a few days ago and has been taking laxatives to relieve constipation that she has been experiencing  Patient with no vaginal bleeding or discharge, no dysuria or frequency, no fevers or chills at home, patient denies any previous known history of ovarian torsion or ovarian cyst   No further complaints at this time  None       Past Medical History:   Diagnosis Date   • Postural orthostatic tachycardia syndrome        Past Surgical History:   Procedure Laterality Date   • NASAL POLYP SURGERY         History reviewed  No pertinent family history  I have reviewed and agree with the history as documented  E-Cigarette/Vaping   • E-Cigarette Use Never User      E-Cigarette/Vaping Substances     Social History     Tobacco Use   • Smoking status: Never     Passive exposure: Never   • Smokeless tobacco: Never   Vaping Use   • Vaping Use: Never used   Substance Use Topics   • Alcohol use: Not Currently   • Drug use: Yes     Types: Marijuana       Review of Systems   Constitutional: Negative for chills and fever  HENT: Negative for ear pain and sore throat  Eyes: Negative for pain and visual disturbance  Respiratory: Negative for cough and shortness of breath  Cardiovascular: Negative for chest pain and palpitations  Gastrointestinal: Positive for abdominal pain  Negative for vomiting  Genitourinary: Negative for difficulty urinating, dysuria, flank pain, frequency, hematuria, pelvic pain, urgency, vaginal bleeding, vaginal discharge and vaginal pain  Musculoskeletal: Negative for arthralgias and back pain  Skin: Negative for color change and rash  Neurological: Negative for seizures and syncope  All other systems reviewed and are negative  Physical Exam  Physical Exam  Vitals and nursing note reviewed  Constitutional:       General: She is not in acute distress  Appearance: She is well-developed  HENT:      Head: Normocephalic and atraumatic  Mouth/Throat:      Mouth: Mucous membranes are moist       Pharynx: Oropharynx is clear  Eyes:      Extraocular Movements: Extraocular movements intact  Conjunctiva/sclera: Conjunctivae normal       Pupils: Pupils are equal, round, and reactive to light  Cardiovascular:      Rate and Rhythm: Normal rate and regular rhythm  Heart sounds: No murmur heard  Pulmonary:      Effort: Pulmonary effort is normal  No respiratory distress  Breath sounds: Normal breath sounds  Abdominal:      Palpations: Abdomen is soft  Tenderness: There is abdominal tenderness in the right lower quadrant  Musculoskeletal:         General: No swelling  Cervical back: Neck supple  Skin:     General: Skin is warm and dry  Capillary Refill: Capillary refill takes less than 2 seconds  Neurological:      General: No focal deficit present  Mental Status: She is alert and oriented to person, place, and time     Psychiatric:         Mood and Affect: Mood normal          Vital Signs  ED Triage Vitals   Temperature Pulse Respirations Blood Pressure SpO2   11/29/22 2101 11/29/22 2101 11/29/22 2101 11/29/22 2101 11/29/22 2101   99 °F (37 2 °C) (!) 124 16 135/80 99 %      Temp Source Heart Rate Source Patient Position - Orthostatic VS BP Location FiO2 (%)   11/29/22 2101 11/29/22 2101 11/29/22 2200 11/29/22 2200 --   Oral Monitor Lying Left arm       Pain Score       11/29/22 2101       8           Vitals:    11/29/22 2345 11/30/22 0000 11/30/22 0100 11/30/22 0245   BP: 114/74 108/70 119/76 131/87   Pulse: 103 102 103 (!) 113   Patient Position - Orthostatic VS: Lying Lying Lying Lying         Visual Acuity      ED Medications  Medications   sodium chloride 0 9 % bolus 1,000 mL (0 mL Intravenous Stopped 11/29/22 2334)   ondansetron (ZOFRAN) injection 4 mg (4 mg Intravenous Given 11/29/22 2155)   iohexol (OMNIPAQUE) 350 MG/ML injection (SINGLE-DOSE) 100 mL (100 mL Intravenous Given 11/29/22 2259)   ketorolac (TORADOL) injection 15 mg (15 mg Intramuscular Given 11/30/22 0242)       Diagnostic Studies  Results Reviewed     Procedure Component Value Units Date/Time    POCT pregnancy, urine [[id number]]  (Normal) Resulted: 11/29/22 2210    Lab Status: Final result Updated: 11/29/22 2249     EXT Preg Test, Ur Negative     Control Valid    Comprehensive metabolic panel [[id number]]  (Abnormal) Collected: 11/29/22 2151    Lab Status: Final result Specimen: Blood from Arm, Right Updated: 11/29/22 2225     Sodium 135 mmol/L      Potassium 3 9 mmol/L      Chloride 97 mmol/L      CO2 23 mmol/L      ANION GAP 15 mmol/L      BUN 12 mg/dL      Creatinine 0 96 mg/dL      Glucose 86 mg/dL      Calcium 9 6 mg/dL      AST 18 U/L      ALT 21 U/L      Alkaline Phosphatase 49 U/L      Total Protein 8 4 g/dL      Albumin 4 5 g/dL      Total Bilirubin 1 12 mg/dL      eGFR 84 ml/min/1 73sq m     Narrative:      [place] guidelines for Chronic Kidney Disease (CKD):   •  Stage 1 with normal or high GFR (GFR > 90 mL/min/1 73 square meters)  •  Stage 2 Mild CKD (GFR = 60-89 mL/min/1 73 square meters)  •  Stage 3A Moderate CKD (GFR = 45-59 mL/min/1 73 square meters)  •  Stage 3B Moderate CKD (GFR = 30-44 mL/min/1 73 square meters)  •  Stage 4 Severe CKD (GFR = 15-29 mL/min/1 73 square meters)  •  Stage 5 End Stage CKD (GFR <15 mL/min/1 73 square meters)  Note: GFR calculation is accurate only with a steady state creatinine    Lipase [[id number]]  (Abnormal) Collected: 11/29/22 2151    Lab Status: Final result Specimen: Blood from Arm, Right Updated: 11/29/22 2225     Lipase 63 u/L     UA w Reflex to Microscopic w Reflex to Culture [[id number]]  (Abnormal) Collected: 11/29/22 2151    Lab Status: Final result Specimen: Urine, Clean Catch Updated: 11/29/22 2211     Color, UA Light Yellow     Clarity, UA Clear     Specific Gravity, UA 1 017     pH, UA 5 5     Leukocytes, UA Negative     Nitrite, UA Negative     Protein, UA Negative mg/dl      Glucose, UA Negative mg/dl      Ketones,  (4+) mg/dl      Urobilinogen, UA <2 0 mg/dl      Bilirubin, UA Negative     Occult Blood, UA Negative    CBC and differential [[id number]]  (Abnormal) Collected: 11/29/22 2151    Lab Status: Final result Specimen: Blood from Arm, Right Updated: 11/29/22 2207     WBC 10 69 Thousand/uL      RBC 5 06 Million/uL      Hemoglobin 14 5 g/dL      Hematocrit 43 5 %      MCV 86 fL      MCH 28 7 pg      MCHC 33 3 g/dL      RDW 13 7 %      MPV 11 3 fL      Platelets 348 Thousands/uL      nRBC 0 /100 WBCs      Neutrophils Relative 65 %      Immat GRANS % 0 %      Lymphocytes Relative 24 %      Monocytes Relative 8 %      Eosinophils Relative 2 %      Basophils Relative 1 %      Neutrophils Absolute 7 03 Thousands/µL      Immature Grans Absolute 0 03 Thousand/uL      Lymphocytes Absolute 2 53 Thousands/µL      Monocytes Absolute 0 84 Thousand/µL      Eosinophils Absolute 0 17 Thousand/µL      Basophils Absolute 0 09 Thousands/µL                  US pelvis complete w transvaginal   Final Result by Merissa Felder DO (11/30 4857)       3 5 cm hypoechoic cyst in the right ovary, vascular flow to the right ovary is preserved, this does not exclude a diagnosis of ovarian torsion    Correlation with the patient's symptoms recommended  Small amount of hypoechoic fluid in the region of the right adnexa and pelvis, nonspecific  Normal appearance of the uterus and left ovary  Other findings as above  Workstation performed: [id number]         CT abdomen pelvis with contrast   Final Result by Jeri Quinn DO (11/30 2235)      Right-sided 4 x 3 5 cm adnexal cyst   If clinically warranted pelvic sonogram may be obtained for further evaluation  The study was marked in [facility] for immediate notification  Workstation performed: [id number]                    Procedures  Procedures         ED Course  ED Course as of 11/30/22 0616   Wed Nov 30, 2022   0023 Call placed to [facility] to perform ultrasound immediately  1468 Updated patient on results of imaging and labwork  Plan of care discussed  TT sent to OB/GYN for further input  Dr Karen Smith  reports, Given mild symptoms at this time  Patient can follow up with OBGYN as an outpatient  And rescan in 6 months                               SBIRT 20yo+    Flowsheet Row Most Recent Value   SBIRT (25 yo +)    In order to provide better care to our patients, we are screening all of our patients for alcohol and drug use  Would it be okay to ask you these screening questions? Yes Filed at: 11/29/2022 2132   Initial Alcohol Screen: US AUDIT-C     1  How often do you have a drink containing alcohol? 0 Filed at: 11/29/2022 2132   2  How many drinks containing alcohol do you have on a typical day you are drinking? 0 Filed at: 11/29/2022 2132   3b  FEMALE Any Age, or MALE 65+: How often do you have 4 or more drinks on one occassion? 0 Filed at: 11/29/2022 2132   Audit-C Score 0 Filed at: 11/29/2022 2132   IWONA: How many times in the past year have you    Used an illegal drug or used a prescription medication for non-medical reasons?  Never Filed at: 11/29/2022 2132                    MDM  Number of Diagnoses or Management Options  Abdominal pain  Cyst of right ovary  Diagnosis management comments: Patient had significant improvement of abdominal pain and tenderness to palpation to the right lower quadrant here in the emergency department  Labs were reviewed with her at bedside and imaging was reviewed  Began with CT imaging which I had reviewed independently with attending, noted cystic structure  Immediately ordered ultrasound which revealed patent flow to the right ovary  There is no enlargement of the ovary  I discussed this finding with OBGYN, who I forwarded the official read an impression of the imaging  They report that given the improvement in patient's symptoms and the small size of this cyst, torsion is unlikely in the patient should follow-up as an outpatient with a re-scanned in 6 weeks to see if there is significant growth  This was discussed with the patient at bedside  The patient reported that she lives in [place], however she can stay here for as long as she needs it she has family here  I discussed the need for very close follow-up with OBGYN, and advised her that if she can to stay in follow-up within the next week here with OBGYN and then attempt to transfer care to her home in [place]  Discussed calling insurance/calling local health that works and the area near her home for close follow-up  I discussed very strict return criteria with the patient at bedside  I advised her to return if she has no relief of symptoms or if there is worsening of symptoms  Patient demonstrated understanding  Other strict return criteria discussed with her at bedside         Amount and/or Complexity of Data Reviewed  Clinical lab tests: reviewed and ordered  Tests in the radiology section of CPT®: ordered and reviewed  Tests in the medicine section of CPT®: ordered and reviewed  Obtain history from someone other than the patient: yes (Patient's family member at bedside )  Review and summarize past medical records: yes  Discuss the patient with other providers: yes (Attending, OBGYN, Dr Elaine Aguilar)  Independent visualization of images, tracings, or specimens: yes    Risk of Complications, Morbidity, and/or Mortality  Presenting problems: moderate  Diagnostic procedures: moderate  Management options: moderate    Patient Progress  Patient progress: improved      Disposition  Final diagnoses:   Cyst of right ovary   Abdominal pain     Time reflects when diagnosis was documented in both MDM as applicable and the Disposition within this note     Time User Action Codes Description Comment    11/30/2022  2:30 AM Macey Cannon Add [W05 170] Cyst of right ovary     11/30/2022  2:30 AM Macey Cannon Add [R10 9] Abdominal pain       ED Disposition     ED Disposition   Discharge    Condition   Stable    Date/Time   Wed Nov 30, 2022 96 [facility] discharge to home/self care  Follow-up Information     Follow up With Specialties Details Why Contact Info Additional 2000 [facility] Emergency Department Emergency Medicine Go to  If symptoms worsen [address] Emergency Department, [address] [place] [address] Obstetrics and Gynecology Call today To schedule an appointment for follow-up within the next 1 week  [address]  [address] [place] [address], [facility], [address] [place] [address]   [phone]          There are no discharge medications for this patient            PDMP Review     None          ED Provider  Electronically Signed by           Katheleen Collet, PA-C  11/30/22 8662

## 2022-11-30 NOTE — ED ATTENDING ATTESTATION
11/30/2022  Mansi Salas DO, saw and evaluated the patient  I have discussed the patient with the resident/non-physician practitioner and agree with the resident's/non-physician practitioner's findings, Plan of Care, and MDM as documented in the resident's/non-physician practitioner's note, except where noted  All available labs and Radiology studies were reviewed  I was present for key portions of any procedure(s) performed by the resident/non-physician practitioner and I was immediately available to provide assistance  At this point I agree with the current assessment done in the Emergency Department  I have conducted an independent evaluation of this patient a history and physical is as follows:    Patient is a 76-year-old female who presents with right pelvic pain  Patient states that her symptoms started 4 days ago and have been intermittent until yesterday  She states the pain has been more constant and more severe since yesterday  She denies nausea, vomiting, diarrhea, constipation  She was seen in the ED last evening and had a CT and pelvic ultrasound done  She followed up with OB today and was told to come to the ED immediately for possible surgical intervention  On exam, patient is in acute distress secondary to pain  Heart is tachycardic, regular rhythm  Breath sounds normal   Abdomen is soft, tender to palpation in the right lower quadrant and right pelvis  No rebound or guarding  OB evaluated patient and will take to OR  Concern for ovarian torsion  Patient is stable  Portions of the above record have been created with voice recognition software  Occasional wrong word or "sound alike" substitutions may have occurred due to the inherent limitations of voice recognition software  Read the chart carefully and recognize, using context, where substitutions may have occurred        ED Course         Critical Care Time  Procedures

## 2022-11-30 NOTE — H&P
H&P Exam - Gynecology   Mary Stone [facility] 25 y o  female MRN: [id number]  Unit/Bed#: ED-16 Encounter: [id number]      Assessment/Plan     A/P: 23yo G0 who presents with RLQ pain, ovarian cyst, and concern for ovarian torsion, with persistent and worsening right sided pain  1) NPO, to OR for diagnostic laparoscopy, possible right ovarian cystectomy, possible right oophorectomy, and all other indicated procedures  Discussed procedure with patient and her father, reviewed risks, benefits, and indications  Discussed risks of infection, bleeding, injury to nearby organs including bowel, bladder, ureters, blood vessels, and ovary  Discussed potential risk for cancer in ovarian cyst, discussed that due to cyst's appearance, risk for cancer would be low, but if cystectomy is performed, it would be evaluated by pathology for full diagnosis  All questions answered    Code Status: No Order      History of Present Illness     HPI:  Victorino Elam is a 25 y o  female who presents with persistent and worsening RLQ pain, concern for ovarian torsion  She reports having had RLQ discomfort a few months ago that resolved  About 4 days ago, she had onset of intermittent right sided cramping pains that slowly worsened throughout that day, but then improved  They returned the next day and were associated with nausea  Yesterday, the pain became constant and worsening and she presented to the ED for evaluation  She received pain medications and was noted to have a right sided ovarian cyst with preserved vascular flow  Symptoms were mild at the time and improved during evaluation, she was deemed stable for follow up outpatient  She reports the pain recurred and she is in significant discomfort  She presented to the Ob/Gyn this afternoon and was noted to be in significant discomfort with any movement, unable to perform activities of daily living  She was sent back to the ED for concern for torsion   She reports significant discomfort in car ride with bumps or position changes  The pain does not radiate, is constant now, is not associated with other symptoms, is not improved in any positions  She is now starting to experience some right shoulder discomfort  She has been having some constipation since Thanksgiving, has been taking laxatives but feels unable to push for a bowel movement due to the pain  She had some bleeding with a bowel movement a few days ago which she believed was consistent with a hemorrhoid, and Preparation H helped with her symptoms  She denies any history of Ob/Gyn abnormalities, is not currently sexually active, and has had no abdominal surgeries in the past  She reports a family history of ovarian cancer in her paternal grandmother and breast cancer in a paternal aunt  She denies any knowledge of genetic testing  LMP was 1 month ago, her periods are regular and without significant pain  She started having a little vaginal bleeding today  Review of Systems   Constitutional: Negative for chills and fever  HENT: Negative for nosebleeds and rhinorrhea  Eyes: Negative for visual disturbance  Respiratory: Negative for cough, shortness of breath and wheezing  Cardiovascular: Negative for chest pain, palpitations and leg swelling  Gastrointestinal: Positive for abdominal pain, anal bleeding, constipation and nausea  Negative for diarrhea and vomiting  Genitourinary: Positive for vaginal bleeding (slight amount of bleeding today)  Negative for vaginal discharge and vaginal pain  Musculoskeletal: Negative for back pain, myalgias and neck pain  Skin: Negative for pallor, rash and wound  Neurological: Negative for dizziness, seizures, syncope and headaches  Hematological: Does not bruise/bleed easily  Psychiatric/Behavioral: Positive for sleep disturbance         Historical Information   Past Medical History:   Diagnosis Date   • Postural orthostatic tachycardia syndrome      Past Surgical History:   Procedure Laterality Date   • NASAL POLYP SURGERY       OB/GYN History: G0    Family History   Problem Relation Age of Onset   • Ovarian cancer Paternal Grandmother    • Breast cancer Paternal Aunt    • Breast cancer Maternal Aunt      Social History   Social History     Substance and Sexual Activity   Alcohol Use Not Currently     Social History     Substance and Sexual Activity   Drug Use Yes   • Types: Marijuana     Social History     Tobacco Use   Smoking Status Never   • Passive exposure: Never   Smokeless Tobacco Never       Meds/Allergies   (Not in a hospital admission)    Allergies   Allergen Reactions   • Meloxicam Tongue Swelling       Objective   /87 (BP Location: Right arm)   Pulse 99   Temp 98 3 °F (36 8 °C) (Oral)   Resp 20   LMP 11/30/2022 (Approximate)   SpO2 100%     No intake or output data in the 24 hours ending 11/30/22 1822      Physical Exam  Constitutional:       General: She is in acute distress (appears uncomfortable)  Appearance: She is not ill-appearing or toxic-appearing  Comments: Crying and tearful   HENT:      Head: Normocephalic and atraumatic  Eyes:      General: No scleral icterus  Conjunctiva/sclera: Conjunctivae normal    Cardiovascular:      Rate and Rhythm: Tachycardia present  Pulses: Normal pulses  Pulmonary:      Effort: Pulmonary effort is normal  No respiratory distress  Abdominal:      General: There is no distension  Palpations: Abdomen is soft  Tenderness: There is abdominal tenderness (RLQ)  There is no guarding or rebound  Musculoskeletal:      Cervical back: Normal range of motion  Right lower leg: No edema  Left lower leg: No edema  Skin:     General: Skin is warm and dry  Neurological:      General: No focal deficit present  Mental Status: She is alert  Mental status is at baseline     Psychiatric:         Mood and Affect: Mood normal          Behavior: Behavior normal          Lab Results: Admission on 11/30/2022   Component Date Value   • WBC 11/30/2022 7 83    • RBC 11/30/2022 5 04    • Hemoglobin 11/30/2022 14 0    • Hematocrit 11/30/2022 43 3    • MCV 11/30/2022 86    • MCH 11/30/2022 27 8    • MCHC 11/30/2022 32 3    • RDW 11/30/2022 13 8    • MPV 11/30/2022 11 5    • Platelets 39/52/7642 265    • nRBC 11/30/2022 0    • Neutrophils Relative 11/30/2022 65    • Immat GRANS % 11/30/2022 0    • Lymphocytes Relative 11/30/2022 25    • Monocytes Relative 11/30/2022 7    • Eosinophils Relative 11/30/2022 2    • Basophils Relative 11/30/2022 1    • Neutrophils Absolute 11/30/2022 5 07    • Immature Grans Absolute 11/30/2022 0 02    • Lymphocytes Absolute 11/30/2022 1 93    • Monocytes Absolute 11/30/2022 0 58    • Eosinophils Absolute 11/30/2022 0 16    • Basophils Absolute 11/30/2022 0 07    • Sodium 11/30/2022 137    • Potassium 11/30/2022 4 0    • Chloride 11/30/2022 103    • CO2 11/30/2022 19 (L)    • ANION GAP 11/30/2022 15 (H)    • BUN 11/30/2022 11    • Creatinine 11/30/2022 0 80    • Glucose 11/30/2022 74    • Calcium 11/30/2022 10 1    • AST 11/30/2022 14    • ALT 11/30/2022 12    • Alkaline Phosphatase 11/30/2022 40    • Total Protein 11/30/2022 8 1    • Albumin 11/30/2022 4 8    • Total Bilirubin 11/30/2022 0 90    • eGFR 11/30/2022 105    • EXT Preg Test, Ur 11/30/2022 Negative    • Control 11/30/2022 Valid    Admission on 11/29/2022, Discharged on 11/30/2022   Component Date Value   • WBC 11/29/2022 10 69 (H)    • RBC 11/29/2022 5 06    • Hemoglobin 11/29/2022 14 5    • Hematocrit 11/29/2022 43 5    • MCV 11/29/2022 86    • MCH 11/29/2022 28 7    • MCHC 11/29/2022 33 3    • RDW 11/29/2022 13 7    • MPV 11/29/2022 11 3    • Platelets 25/73/2723 263    • nRBC 11/29/2022 0    • Neutrophils Relative 11/29/2022 65    • Immat GRANS % 11/29/2022 0    • Lymphocytes Relative 11/29/2022 24    • Monocytes Relative 11/29/2022 8    • Eosinophils Relative 11/29/2022 2    • Basophils Relative 11/29/2022 1    • Neutrophils Absolute 11/29/2022 7 03    • Immature Grans Absolute 11/29/2022 0 03    • Lymphocytes Absolute 11/29/2022 2 53    • Monocytes Absolute 11/29/2022 0 84    • Eosinophils Absolute 11/29/2022 0 17    • Basophils Absolute 11/29/2022 0 09    • Sodium 11/29/2022 135    • Potassium 11/29/2022 3 9    • Chloride 11/29/2022 97    • CO2 11/29/2022 23    • ANION GAP 11/29/2022 15 (H)    • BUN 11/29/2022 12    • Creatinine 11/29/2022 0 96    • Glucose 11/29/2022 86    • Calcium 11/29/2022 9 6    • AST 11/29/2022 18    • ALT 11/29/2022 21    • Alkaline Phosphatase 11/29/2022 49    • Total Protein 11/29/2022 8 4    • Albumin 11/29/2022 4 5    • Total Bilirubin 11/29/2022 1 12 (H)    • eGFR 11/29/2022 84    • Lipase 11/29/2022 63 (L)    • Color, UA 11/29/2022 Light Yellow    • Clarity, UA 11/29/2022 Clear    • Specific Gravity, UA 11/29/2022 1 017    • pH, UA 11/29/2022 5 5    • Leukocytes, UA 11/29/2022 Negative    • Nitrite, UA 11/29/2022 Negative    • Protein, UA 11/29/2022 Negative    • Glucose, UA 11/29/2022 Negative    • Ketones, UA 11/29/2022 150 (4+) (A)    • Urobilinogen, UA 11/29/2022 <2 0    • Bilirubin, UA 11/29/2022 Negative    • Occult Blood, UA 11/29/2022 Negative    • EXT Preg Test, Ur 11/29/2022 Negative    • Control 11/29/2022 Valid           Santi Centeno MD  11/30/2022  6:22 PM

## 2022-11-30 NOTE — PROGRESS NOTES
Diagnoses and all orders for this visit:    Cyst of right ovary  -     Ambulatory Referral to Obstetrics / Gynecology    Pelvic pain    Constipation, unspecified constipation type       -      Will try Miralax    Other orders        -     Continue Naproxen  Consulted with CS, advised to go to the [facility] for ovarian torsion evaluation, all questions answered  Pleasant 25 y o  NP here for right sided pelvic pain complaints which are worse  She is here with her mother's boyfriend  She states her pain is 8/10 on a pain scale and is worse since yesterday  She also states her menses started today  She is asking for surgery  She denies any treatments tried other than Naproxen  She denies vaginal discharge, itching or odor  She denies sexual IC in 2 5 yrs but has engaged in other activity  She agrees to an STD check today  She has a hx of hemorrhoids and constipation which in addition to her ovarian cyst are making it difficult for her to have a bowel movement  She was crying at the end of the visit because it hurts to even put her shoes back on  Past Medical History:   Diagnosis Date   • Postural orthostatic tachycardia syndrome      Past Surgical History:   Procedure Laterality Date   • NASAL POLYP SURGERY       Social History     Tobacco Use   • Smoking status: Never     Passive exposure: Never   • Smokeless tobacco: Never   Vaping Use   • Vaping Use: Never used   Substance Use Topics   • Alcohol use: Not Currently   • Drug use: Yes     Types: Marijuana     Family History   Problem Relation Age of Onset   • Ovarian cancer Paternal Grandmother    • Breast cancer Paternal Aunt    • Breast cancer Maternal Aunt        Current Outpatient Medications:   •  cyclobenzaprine (FLEXERIL) 5 mg tablet, Take 5 mg by mouth Three times daily as needed, Disp: , Rfl:   •  ibuprofen (MOTRIN) 200 mg tablet, Take by mouth, Disp: , Rfl:   No current facility-administered medications for this visit      Allergies Allergen Reactions   • Meloxicam Tongue Swelling     OB History    Para Term  AB Living   0 0 0 0 0 0   SAB IAB Ectopic Multiple Live Births   0 0 0 0 0     Lives in [address] [facility] and was visiting her mom for the holidays  Here with her mom's boyfriend  Vitals:    22 1410   Weight: 57 6 kg (127 lb)   Height: 5' 3" (1 6 m)     Body mass index is 22 5 kg/m²  Patient's last menstrual period was 10/31/2022 (approximate)  Review of Systems   Constitutional: Negative for fatigue and unexpected weight change  Respiratory: Negative for shortness of breath  Gastrointestinal: She states she has a hemorrhoid history and sees GI in [place]    Genitourinary: Negative for difficulty urinating, dysuria and hematuria  Physical Exam   Constitutional: She appears well-developed and well-nourished  No distress  Alert and oriented  HENT: atraumatic  Head: Normocephalic  Neck: Normal range of motion  Neck supple  Pulmonary: Effort normal   Abdominal: Soft  Pelvic exam was performed with patient supine  No labial fusion  There is no rash, tenderness, lesion or injury on the right labia  There is no rash, tenderness, lesion or injury on the left labia  Urethral meatus does not show any tenderness, inflammation or discharge  Palpation of midline bladder without pain or discomfort  Uterus is not deviated, not enlarged, not fixed and not tender  Cervix exhibits no motion tenderness, no discharge and no friability  Right adnexum displays  ++tenderness and guarding  Left adnexum displays no mass, no tenderness and no fullness  No erythema or tenderness in the vagina  No foreign body in the vagina  No signs of injury around the vagina  NO Vaginal discharge found  Perineum and anus without areas of injury  No lesions noted or swelling  Lymphadenopathy:        Right: No inguinal adenopathy present  Left: No inguinal adenopathy present

## 2022-12-01 ENCOUNTER — TELEPHONE (OUTPATIENT)
Dept: OBGYN CLINIC | Facility: CLINIC | Age: 22
End: 2022-12-01

## 2022-12-01 NOTE — ANESTHESIA POSTPROCEDURE EVALUATION
Post-Op Assessment Note    CV Status:  Stable  Pain Score: 0    Pain management: adequate     Mental Status:  Arousable and sleepy   Hydration Status:  Euvolemic and stable   PONV Controlled:  Controlled   Airway Patency:  Patent and adequate      Post Op Vitals Reviewed: Yes      Staff: CRNA         No notable events documented      BP 92/58 (11/30/22 2041)    Temp (!) 97 2 °F (36 2 °C) (11/30/22 2041)    Pulse 80 (11/30/22 2041)   Resp 17 (11/30/22 2041)    SpO2 98 % (11/30/22 2041)

## 2022-12-01 NOTE — OP NOTE
OPERATIVE REPORT  PATIENT NAME: Marvin Castro    :  2000  MRN: [id number]  Pt Location: AN OR ROOM 04    SURGERY DATE: 2022    Surgeon(s) and Role:     * Palomo Mckeon MD - Primary     * Magaly Huynh MD - Assisting    Preop Diagnosis:  Concern for torsion, ovary right  Right lower quadrant pain    Post-Op Diagnosis Codes:     Right ovarian cyst    Procedure(s) (LRB):  LAPAROSCOPY DIAGNOSTIC; RIGHT OVARIAN CYSTECTOMY (N/A)  CYSTECTOMY OVARIAN, LAPAROSCOPIC (Right)  OOPHORECTOMY, LAPAROSCOPIC (N/A)    Specimen(s):  ID Type Source Tests Collected by Time Destination   1 : Right ovarian cyst wall Tissue Ovary, Cyst TISSUE EXAM Palomo Mckeon MD 2022        Estimated Blood Loss:   Minimal    Drains:  Urethral Catheter Non-latex 16 Fr  (Active)   Number of days: 0       Anesthesia Type:   Choice    Operative Indications:  Right lower quadrant pain  Right ovarian cyst  Concern for right ovarian torsion    Operative Findings:  Normal external genitalia  Small mobile anteverted uterus  Small mobile right adnexal mass  Cervix smooth and nulliparous  Normal upper abdominal survey without adhesive disease, normal appendix  Pelvic survey with normal uterus, bilateral tubes and left ovary  Right ovary with 3 cm cyst  Cyst unavoidably ruptured due to thin wall with straw colored fluid    Complications:   None    Procedure and Technique:  After informed consent was obtained, the patient was taken to the operating room where general endotracheal anesthesia was administered without difficulty  An OG tube was placed  The patient was placed in the dorsal lithotomy position with legs in yellow fin stirrups and examined under anesthesia  She was then prepped and draped in the normal sterile fashion  Her bladder was drained and a sponge stick was inserted into the posterior fornix of the vagina       Attention was then turned to the patient's abdomen where 0 25% marcain was injected and a 5-mm incision was made in the inferior umbilicus  The abdominal wall was tented and a 5-mm OptiView port was placed under direct visualization  A survey of the upper abdomen and pelvic demonstrated the above  Decision made to remove right ovarian cyst  The patient was placed in Trendelenburg  Additional local was placed at the next port sites  A 5-mm skin incision was made in the right lower quadrant 2 cm superior to and 2 cm medial to the anterior superior iliac spine  Under direct visualization, a 5-mm port was placed and advanced without difficulty  A 5-mm skin incision was made in the left lower quadrant 2 cm superior to and 2 cm medial to the anterior superior iliac spine  A 5-mm port was placed here in similar fashion  Hot scissors were used to score the ovary covering the cyst  The edge of the ovarian tissue was grasped and peeled away from the underlying cyst  During peeling the cyst was unavoidably ruptured due to its thin wall  The fluid was noted to be straw colored  The cyst wall was was grasped and removed from the underlying ovarian tissue  The cyst wall was placed in 5 mm bag and removed  The edges of the ovarian tissue were made hemostatic with the Bovie  Surgiflo was placed in the cyst cavity  Hemostasis was noted  The instruments were removed from the abdomen under good visualization with good hemostasis noted  The lower quadrant ports were removed under direct visualization  Pneumoperitoneum was released  The umbilical port and laparoscope were removed  The skin was closed with 4-0 monocryl and dressed with exofin  The remainder of the local was injected at the port sites  All instruments were removed from the vagina  The patient tolerated the procedure well, was extubated and was taken to the recovery room in stable condition       I was present for the entire procedure    Patient Disposition:  PACU     This procedure was not performed to treat colon cancer through resection      SIGNATURE: Jack Grey MD  DATE: November 30, 2022  TIME: 8:16 PM

## 2022-12-01 NOTE — TELEPHONE ENCOUNTER
Pt was visiting her mom lives 3 hrs away, not sure when she can come in for her post op, she has finals at school, will cb

## 2022-12-03 LAB
C TRACH DNA SPEC QL NAA+PROBE: NEGATIVE
N GONORRHOEA DNA SPEC QL NAA+PROBE: NEGATIVE

## 2022-12-05 ENCOUNTER — TELEPHONE (OUTPATIENT)
Dept: LABOR AND DELIVERY | Facility: HOSPITAL | Age: 22
End: 2022-12-05

## 2022-12-05 NOTE — TELEPHONE ENCOUNTER
I reached pt at school - she had just returned and overdid it  I told her to lie down and put ice or heat on incision - advil - If pain worsens - to nearest ER   I did go over pathology with pt

## 2022-12-05 NOTE — TELEPHONE ENCOUNTER
Called patient to review benign pathology from surgery and confirm recovery going okay  Mailbox is full  Will send my chart message

## 2022-12-05 NOTE — TELEPHONE ENCOUNTER
Patient does not have my chart set up  Can we try to call and if unable to get through send cert letter with pathology report  Thank you!

## 2022-12-12 ENCOUNTER — TELEPHONE (OUTPATIENT)
Dept: OBGYN CLINIC | Facility: CLINIC | Age: 22
End: 2022-12-12

## 2022-12-12 NOTE — TELEPHONE ENCOUNTER
Cyst removal wound is infected  Pt states it is red, swollen, and hurts to touch  Please call pt to discuss

## 2022-12-12 NOTE — TELEPHONE ENCOUNTER
Pt said the 1 incision is a bit red - still has glue on it   Pt will try warm soaks and get otc abic ointment  Will try this for a day or 2  Pt lives 3 hrs from here

## 2022-12-13 NOTE — TELEPHONE ENCOUNTER
Pt is going to her doctor in [place]  Today for the problem she is having  Can we fax her ct scan and records from her recent procedure with Dr Dwayne Johansen to Dr Quilla Olszewski @[phone]  Phone : [phone]  She is having a problem with  her incision And is still in pain  Also needs to find out if the cyst that was removed was sent to a lab for pathology and if any results are available  I will email pt record release -pt will send completed form to [employer] [address] [place] [address]

## 2022-12-15 ENCOUNTER — TELEPHONE (OUTPATIENT)
Dept: OBGYN CLINIC | Age: 22
End: 2022-12-15

## 2022-12-15 NOTE — TELEPHONE ENCOUNTER
----- Message from Digna Conley, [address] sent at 12/6/2022  2:30 PM EST -----  Please notify patient her GC/CT results were normal  Thanks

## (undated) DEVICE — TISSUE RETRIEVAL SYSTEM: Brand: INZII RETRIEVAL SYSTEM

## (undated) DEVICE — TROCAR: Brand: KII® SLEEVE

## (undated) DEVICE — CHLORHEXIDINE 4PCT 4 OZ

## (undated) DEVICE — CHLORAPREP HI-LITE 26ML ORANGE

## (undated) DEVICE — TROCAR: Brand: KII FIOS FIRST ENTRY

## (undated) DEVICE — GLOVE PI ULTRA TOUCH SZ.6.5

## (undated) DEVICE — HEMOSTATIC MATRIX SURGIFLO 8ML W/THROMBIN

## (undated) DEVICE — TRAY FOLEY 16FR URIMETER SILICONE SURESTEP

## (undated) DEVICE — INSUFLATION TUBING INSUFLOW (LEXION)

## (undated) DEVICE — INTENDED FOR TISSUE SEPARATION, AND OTHER PROCEDURES THAT REQUIRE A SHARP SURGICAL BLADE TO PUNCTURE OR CUT.: Brand: BARD-PARKER SAFETY BLADES SIZE 11, STERILE

## (undated) DEVICE — GLOVE INDICATOR PI UNDERGLOVE SZ 7 BLUE

## (undated) DEVICE — ADHESIVE SKIN HIGH VISCOSITY EXOFIN MICRO 0.5ML

## (undated) DEVICE — APPLICATOR ENDO SURGICEL

## (undated) DEVICE — HEAVY DUTY TABLE COVER: Brand: CONVERTORS

## (undated) DEVICE — IRRIG ENDO FLO TUBING

## (undated) DEVICE — ENDOPATH 5MM CURVED SCISSORS WITH MONOPOLAR CAUTERY: Brand: ENDOPATH

## (undated) DEVICE — TOWEL SURG XR DETECT GREEN STRL RFD

## (undated) DEVICE — ADHESIVE SKIN HIGH VISCOSITY EXOFIN 1ML

## (undated) DEVICE — PREMIUM DRY TRAY LF: Brand: MEDLINE INDUSTRIES, INC.

## (undated) DEVICE — X-RAY DETECTABLE SPONGES,16 PLY: Brand: VISTEC

## (undated) DEVICE — UTERINE MANIPULATOR 4.5MM STRL

## (undated) DEVICE — BETHLEHEM UNIVERSAL GYN LAP PK: Brand: CARDINAL HEALTH